# Patient Record
Sex: FEMALE | Race: BLACK OR AFRICAN AMERICAN | NOT HISPANIC OR LATINO
[De-identification: names, ages, dates, MRNs, and addresses within clinical notes are randomized per-mention and may not be internally consistent; named-entity substitution may affect disease eponyms.]

---

## 2022-09-02 ENCOUNTER — APPOINTMENT (OUTPATIENT)
Dept: ORTHOPEDIC SURGERY | Facility: CLINIC | Age: 72
End: 2022-09-02

## 2022-09-02 VITALS — HEIGHT: 70.5 IN | WEIGHT: 171 LBS | BODY MASS INDEX: 24.21 KG/M2

## 2022-09-02 DIAGNOSIS — I10 ESSENTIAL (PRIMARY) HYPERTENSION: ICD-10-CM

## 2022-09-02 PROCEDURE — 73110 X-RAY EXAM OF WRIST: CPT | Mod: RT

## 2022-09-02 PROCEDURE — 99214 OFFICE O/P EST MOD 30 MIN: CPT

## 2022-09-02 PROCEDURE — 73030 X-RAY EXAM OF SHOULDER: CPT | Mod: RT

## 2022-09-02 NOTE — IMAGING
[de-identified] : RT wrist Exam: Terry weight bearing on the RT hand produces mild to moderate pain on the terry surface, which radiates to the volar aspect of the forearm.  Finkelstein's test is negative.   strength is good and does not reproduce wrist pain.  There is no tenderness over the SL junction. There is no snuff box tenderness or any other tenderness.\par \par RT shoulder Exam: GH abduction is 80 degrees  ER at full abduction if forearm vertical with pain at the endpoint.  ER at zero is 70 degrees with pain at the endpoint.  There is a slight internal impingement sign.  Neer's test is positive for internal impingement.  Empty can strength is very good and mildly painful.  ER strength is excellent.  IR strength is excellent.\par \par X-ray of the RT shoulder (2 views) on 09/02/2022:\par RT Shoulder: Unremarkable\par \par X-ray of the RT Wrist (5 views) on 09/02/2022 :\par RT Wrist: Slight OA of thumb intermediate, otherwise unremarkable.\par  \par

## 2022-09-02 NOTE — HISTORY OF PRESENT ILLNESS
[Gradual] : gradual [9] : 9 [5] : 5 [Dull/Aching] : dull/aching [Sharp] : sharp [Stabbing] : stabbing [Household chores] : household chores [Leisure] : leisure [Rest] : rest [Heat] : heat [Intermittent] : intermittent [de-identified] : 09/02/2022: RT Wrist and RT shoulder\par Pt reports pain in the RT wrist occurs when grasping objects, twisting the wrist, and lifting.  Pt reports weakness in the RT wrist, and jolts of pain when performing aggravating motions.  Pt reports dull pain in the RUE when lifting herself out of a chair or getting up after laying down in bed.  Pt reports stiffness and radiating pain in the fingers that radiate up to the forearm.  Pt denies N/T in the fingers. [] : no [FreeTextEntry1] : Right wrist and RT shoulder [FreeTextEntry7] : Right shoulder down to elbow/ the wrist is isolated [de-identified] : Activity

## 2022-09-02 NOTE — DISCUSSION/SUMMARY
[de-identified] : 1) I recommend CSI injection in the RT shoudler.  Pt is not interest in CSI injections at this time.\par 2) I recommend PT to teach the patient HEP for strengthening the RT shoulder.\par 3) Pt will continue with conservative treatment including activity modification and home exercise as tolerated. \par \par The patient will F/U in 6 weeks for further evaluation and treatment.\par \par \par The patient was advised of the diagnosis.  The natural history of the pathology was explained in full to the patient in layman's terms, including but not limited to the risks, symptoms and available options for treatment.  We discussed the risks, benefits and alternatives of the treatment options and the advice I provided to the patient as listed above.  Pt was given the opportunity to ask questions, and all questions were answered.  The discussion was not limited to the above.\par \par Entered by Markie Goel acting as scribe.\par

## 2022-10-18 ENCOUNTER — APPOINTMENT (OUTPATIENT)
Dept: ORTHOPEDIC SURGERY | Facility: CLINIC | Age: 72
End: 2022-10-18

## 2022-10-18 VITALS — WEIGHT: 168 LBS | HEIGHT: 71 IN | BODY MASS INDEX: 23.52 KG/M2

## 2022-10-18 DIAGNOSIS — Z78.9 OTHER SPECIFIED HEALTH STATUS: ICD-10-CM

## 2022-10-18 PROCEDURE — 99214 OFFICE O/P EST MOD 30 MIN: CPT

## 2022-10-18 RX ORDER — AMLODIPINE BESYLATE 5 MG/1
TABLET ORAL
Refills: 0 | Status: ACTIVE | COMMUNITY

## 2022-10-18 NOTE — IMAGING
[de-identified] : RT wrist Exam: Terry weight bearing on the RT hand produces mild to moderate pain on the terry surface, which radiates to the volar aspect of the forearm.  Finkelstein's test is negative.   strength is good and does not reproduce wrist pain.  There is no tenderness over the SL junction. There is no snuff box tenderness or any other tenderness.\par \par RT shoulder Exam: GH abduction is 80 degrees  ER at full abduction if forearm vertical with pain at the endpoint.  ER at zero is 70 degrees with pain at the endpoint.  There is a slight internal impingement sign.  Neer's test is positive for internal impingement.  Empty can strength is very good and mildly painful.  ER strength is excellent.  IR strength is excellent.\par \par X-ray of the RT shoulder (2 views) on 09/02/2022: Unremarkable\par \par X-ray of the RT Wrist (5 views) on 09/02/2022: Slight OA of thumb senior living, otherwise unremarkable.\par  \par

## 2022-10-18 NOTE — HISTORY OF PRESENT ILLNESS
[Gradual] : gradual [6] : 6 [2] : 2 [Dull/Aching] : dull/aching [Sharp] : sharp [Stabbing] : stabbing [Frequent] : frequent [Household chores] : household chores [Leisure] : leisure [Rest] : rest [Heat] : heat [de-identified] : 09/02/2022: RT Wrist and RT shoulder\par Pt reports pain in the RT wrist occurs when grasping objects, twisting the wrist, and lifting.  Pt reports weakness in the RT wrist, and jolts of pain when performing aggravating motions.  Pt reports dull pain in the RUE when lifting herself out of a chair or getting up after laying down in bed.  Pt reports stiffness and radiating pain in the fingers that radiate up to the forearm.  Pt denies N/T in the fingers.\par \par 10/18/2022:  RT Wrist and RT shoulder\par Pt reports that she went to 5 sessions of PT, with her last session immediately before this visit.  PT has made a significant difference in the patient's condition, including an improvement with ROM and stretching the shoulder without pain.  Pt reports that her RT wrist pain and strength has improved.  She was only able to lift 1lb with her RT hand, but can now lift 2lbs.  The patient is improving her ability to lift herself out of a chair.  She still has trouble reaching high cabinets in the kitchen.  The patient has been modifying her activity to reduce the amount of weight she lifts, and reduces the period of time she performs activities that strain her RUE. [] : no [FreeTextEntry1] : Right wrist and RT shoulder [FreeTextEntry7] : Right shoulder down to elbow/ the wrist is isolated [de-identified] : Activity  [de-identified] : Dr. Reynoso  [de-identified] : 10/18/2022 [de-identified] : X-Ray

## 2022-10-18 NOTE — DISCUSSION/SUMMARY
[de-identified] : 1) I recommend CSI injection in the RT shoulder.  Pt is not interest in CSI injections at this time.\par 2) Rx for PT to continue current program of strengthening and ROM for the RT shoulder and RT wrist.\par 3) The patient may take OTC NSAIDs PRN for pain. \par 3) Pt will continue with conservative treatment including activity modification and home exercise as tolerated. \par \par The patient will F/U in 6 weeks for further evaluation and treatment.\par \par NSAIDs- The patient was informed of the risks of this medication to GI tract, increased blood pressure, cardiac risk, and risk of fluid retention. Advised to clear medication with internist or PCP if any concurrent health problem with heart, blood pressure, or GI system exists.  The risks, benefits and alternatives of NSAIDs were discussed.  The patient was instructed on the proper usage of  NSAIDs. \par \par The patient was advised of the diagnosis.  The natural history of the pathology was explained in full to the patient in layman's terms, including but not limited to the risks, symptoms and available options for treatment.  We discussed the risks, benefits and alternatives of the treatment options and the advice I provided to the patient as listed above.  Pt was given the opportunity to ask questions, and all questions were answered.  The discussion was not limited to the above.\par \par Entered by Markie Goel acting as scribe.\par

## 2022-11-01 ENCOUNTER — APPOINTMENT (OUTPATIENT)
Dept: ORTHOPEDIC SURGERY | Facility: CLINIC | Age: 72
End: 2022-11-01

## 2022-11-01 VITALS — WEIGHT: 168 LBS | BODY MASS INDEX: 23.52 KG/M2 | HEIGHT: 71 IN

## 2022-11-01 DIAGNOSIS — M47.26 OTHER SPONDYLOSIS WITH RADICULOPATHY, LUMBAR REGION: ICD-10-CM

## 2022-11-01 PROCEDURE — 72170 X-RAY EXAM OF PELVIS: CPT

## 2022-11-01 PROCEDURE — 99214 OFFICE O/P EST MOD 30 MIN: CPT

## 2022-11-01 PROCEDURE — 72110 X-RAY EXAM L-2 SPINE 4/>VWS: CPT

## 2022-11-02 NOTE — IMAGING
[de-identified] : lumbar- +ttp right lower back, full painless range of motion right hip no pain in the groin, + SLR on the right, notes radicular complaints posterior right leg to the foot, strength and sensation intact fully distally  [FreeTextEntry1] : l5-s1 narrowing with posterior facet arthropathy, t12-l1 bridging anterior osteophyte formation [de-identified] : Right hip oa mod and uterine fibroids noted

## 2022-11-02 NOTE — HISTORY OF PRESENT ILLNESS
[Lower back] : lower back [Gradual] : gradual [9] : 9 [8] : 8 [Dull/Aching] : dull/aching [Radiating] : radiating [Sharp] : sharp [Constant] : constant [Nothing helps with pain getting better] : Nothing helps with pain getting better [Retired] : Work status: retired [de-identified] : 11/1/22-  she notes chronic right sided lower back pain with radicular complaints through the posterior right leg into the foot. symptoms are exacerbated with ambulation and prolonged standing but does is also felt while at rest. she has done ice and heat treatments does not want to take any meds.  [] : no [FreeTextEntry5] : pt is 72 years old female who present evaluation of the lumber spine, pt  states she has been experiencing pain on her lower back for years the pain radiating from her back to her right leg  [FreeTextEntry7] : right leg

## 2022-11-20 ENCOUNTER — NON-APPOINTMENT (OUTPATIENT)
Age: 72
End: 2022-11-20

## 2022-11-21 ENCOUNTER — APPOINTMENT (OUTPATIENT)
Dept: VASCULAR SURGERY | Facility: CLINIC | Age: 72
End: 2022-11-21

## 2022-11-21 VITALS
WEIGHT: 176 LBS | OXYGEN SATURATION: 100 % | DIASTOLIC BLOOD PRESSURE: 78 MMHG | HEIGHT: 71 IN | SYSTOLIC BLOOD PRESSURE: 138 MMHG | TEMPERATURE: 98.2 F | BODY MASS INDEX: 24.64 KG/M2 | HEART RATE: 64 BPM

## 2022-11-21 PROCEDURE — 99203 OFFICE O/P NEW LOW 30 MIN: CPT

## 2022-11-29 ENCOUNTER — APPOINTMENT (OUTPATIENT)
Dept: ORTHOPEDIC SURGERY | Facility: CLINIC | Age: 72
End: 2022-11-29

## 2022-11-29 VITALS — HEIGHT: 71 IN | WEIGHT: 176 LBS | BODY MASS INDEX: 24.64 KG/M2

## 2022-11-29 PROCEDURE — 99213 OFFICE O/P EST LOW 20 MIN: CPT

## 2022-11-29 NOTE — IMAGING
[de-identified] : RT wrist Exam: Suggs weight bearing on the RT hand does not produce pain on the palmar surface.  Finkelstein's test is negative.   strength is good and does not reproduce wrist pain.  There is no tenderness over the SL junction. There is no snuff box tenderness or any other tenderness.\par \par RT shoulder Exam: GH abduction is 80 degrees  ER at full abduction if forearm vertical with pain at the endpoint.  ER at zero is 70 degrees with pain at the endpoint.  There is a slight internal impingement sign.  Neer's test is positive for internal impingement.  Empty can strength is very good and mildly painful.  ER strength is excellent.  IR strength is excellent.\par \par X-ray of the RT shoulder (2 views) on 09/02/2022: Unremarkable\par \par X-ray of the RT Wrist (5 views) on 09/02/2022: Slight OA of thumb long-term, otherwise unremarkable.\par  \par

## 2022-11-29 NOTE — HISTORY OF PRESENT ILLNESS
[Gradual] : gradual [6] : 6 [2] : 2 [Dull/Aching] : dull/aching [Sharp] : sharp [Stabbing] : stabbing [Frequent] : frequent [Household chores] : household chores [Leisure] : leisure [Rest] : rest [Heat] : heat [de-identified] : 09/02/2022: RT Wrist and RT shoulder\par Pt reports pain in the RT wrist occurs when grasping objects, twisting the wrist, and lifting.  Pt reports weakness in the RT wrist, and jolts of pain when performing aggravating motions.  Pt reports dull pain in the RUE when lifting herself out of a chair or getting up after laying down in bed.  Pt reports stiffness and radiating pain in the fingers that radiate up to the forearm.  Pt denies N/T in the fingers.\par \par 10/18/2022:  RT Wrist and RT shoulder\par Pt reports that she went to 5 sessions of PT, with her last session immediately before this visit.  PT has made a significant difference in the patient's condition, including an improvement with ROM and stretching the shoulder without pain.  Pt reports that her RT wrist pain and strength has improved.  She was only able to lift 1lb with her RT hand, but can now lift 2lbs.  The patient is improving her ability to lift herself out of a chair.  She still has trouble reaching high cabinets in the kitchen.  The patient has been modifying her activity to reduce the amount of weight she lifts, and reduces the period of time she performs activities that strain her RUE.\par \par 11/29/2022: Right Wrist and Right Shoulder\par Pt reports that her condition has improved following last visit, in which she has been attending PT twice weekly, performing at-home exercises in intervals, modifying activity, and resting her RUE.  Pt reports that she also uses heat for comfort.   [] : no [FreeTextEntry1] : Right Wrist and Right Shoulder [FreeTextEntry7] : Right shoulder down to elbow/ the wrist is isolated [de-identified] : Activity  [de-identified] : Dr. Reynoso  [de-identified] : 10/18/2022 [de-identified] : X-Ray

## 2022-11-29 NOTE — DISCUSSION/SUMMARY
[de-identified] : 1) I recommend CSI injection in the RT shoulder.  Pt is not interest in CSI injections at this time.\par 2) Rx for PT to continue current program of strengthening and ROM for the RT shoulder and RT wrist.\par 3) The patient may take OTC NSAIDs PRN for pain. \par 3) Pt will continue with activity modification and home exercise as tolerated.  The patient should avoid exercise and activity that aggravates pain. \par \par The patient will continue with conservative treatment as described above, and will F/U in 6 weeks.\par \par NSAIDs- The patient was informed of the risks of this medication to GI tract, increased blood pressure, cardiac risk, and risk of fluid retention. Advised to clear medication with internist or PCP if any concurrent health problem with heart, blood pressure, or GI system exists.  The risks, benefits and alternatives of NSAIDs were discussed.  The patient was instructed on the proper usage of  NSAIDs. \par \par The patient was advised of the diagnosis.  The natural history of the pathology was explained in full to the patient in layman's terms, including but not limited to the risks, symptoms and available options for treatment.  We discussed the risks, benefits and alternatives of the treatment options and the advice I provided to the patient as listed above.  Pt was given the opportunity to ask questions, and all questions were answered.  The discussion was not limited to the above.\par \par Entered by Markie Goel acting as scribe.\par

## 2022-12-07 NOTE — HISTORY OF PRESENT ILLNESS
[FreeTextEntry1] : REMY RUST is a 72 year old female who presents for evaluation of varicose veins. \par \par Referred by Dr. Caty Romero. \par \par Patient states that she has had varicose veins since her 20s. She is a former patient of Dr. Mo, who is now retired. She has had procedures done on her bilateral legs with Dr. Mo several years ago. More recently, the left leg has been causing her discomfort. She describes pain with walking with leg swelling. She continues to wear compression stockings. Rather than pain, she reports heaviness and aching that occurs while she is walking. Her left leg varicose veins have also increased in size. No personal or family history of DVT. \par Patient reports a history of a left leg ulcer 20 years ago when she was treated with unna boots by Dr. Mo. \par She reports that she recently underwent a venous reflux study with her cardiologist, Dr. Pereira. \par \par + PMH: HTN\par + PSH: s/p bilateral leg vein procedures\par + FH: HTN, diabetes\par + SH: nonsmoker, no etoh use\par + Aller: NKDA\par \par PCP is Dr. Caty Romero.

## 2022-12-07 NOTE — ASSESSMENT
[FreeTextEntry1] : REMY RUST is a 72 year old female presents for evaluation of varicose veins.\par \par > Venous insufficiency, CEAP C5\par - Patient with recent venous reflux study. Will reach out to Dr. Pereira's office to obtain. \par - Continue compression stockings, leg elevation, and ambulation.\par - Further management upon review of venous reflux studies.

## 2022-12-07 NOTE — ADDENDUM
[FreeTextEntry1] : 12/7/2022 - Spoke w/ patient regarding venous duplex results obtained from Dr. Pereira's office.\par Given symptomatic varicose veins of the left leg despite conservative therapy, will plan for ablation therapy and stab phlebectomy.

## 2022-12-07 NOTE — REVIEW OF SYSTEMS
[As Noted in HPI] : as noted in HPI [Negative] : Heme/Lymph [FreeTextEntry9] : right shoulder pain - undergoing PT

## 2022-12-07 NOTE — PHYSICAL EXAM
[2+] : left 2+ [Varicose Veins Of Lower Extremities] : present [Varicose Veins Of The Left Leg] : of the left leg [Ankle Swelling Bilaterally] : severe [] : bilaterally [Ankle Swelling On The Right] : mild [Calm] : calm [Ankle Swelling (On Exam)] : not present [de-identified] : Well-appearing  [de-identified] : EOMI, anicteric  [de-identified] : soft, nt, nd  [de-identified] : motor and sensation intact in all four extremities  [de-identified] : no wounds on bilateral feet [de-identified] : A&Ox4

## 2022-12-13 ENCOUNTER — APPOINTMENT (OUTPATIENT)
Dept: ORTHOPEDIC SURGERY | Facility: CLINIC | Age: 72
End: 2022-12-13

## 2022-12-13 VITALS — HEIGHT: 71 IN | WEIGHT: 176 LBS | BODY MASS INDEX: 24.64 KG/M2

## 2022-12-13 DIAGNOSIS — M54.16 RADICULOPATHY, LUMBAR REGION: ICD-10-CM

## 2022-12-13 DIAGNOSIS — M48.062 SPINAL STENOSIS, LUMBAR REGION WITH NEUROGENIC CLAUDICATION: ICD-10-CM

## 2022-12-13 PROCEDURE — 99213 OFFICE O/P EST LOW 20 MIN: CPT

## 2022-12-13 NOTE — HISTORY OF PRESENT ILLNESS
[Lower back] : lower back [Gradual] : gradual [Dull/Aching] : dull/aching [Radiating] : radiating [Sharp] : sharp [Constant] : constant [Nothing helps with pain getting better] : Nothing helps with pain getting better [Retired] : Work status: retired [7] : 7 [Heat] : heat [Sitting] : sitting [] : no [FreeTextEntry5] : REMY RUST is a 72 year old female presenting for follow up of lower back. Pt states she has had 4 sessions of PT so far and it has helped greatly. [FreeTextEntry7] : right leg

## 2022-12-29 RX ORDER — LIDOCAINE HYDROCHLORIDE 10 MG/ML
1 INJECTION, SOLUTION INFILTRATION; PERINEURAL
Qty: 50 | Refills: 0 | Status: COMPLETED | COMMUNITY
Start: 2022-12-29 | End: 2023-01-03

## 2022-12-29 RX ORDER — SODIUM BICARBONATE 84 MG/ML
8.4 INJECTION, SOLUTION INTRAVENOUS
Qty: 5 | Refills: 0 | Status: COMPLETED | COMMUNITY
Start: 2022-12-29 | End: 2023-01-03

## 2023-01-03 ENCOUNTER — APPOINTMENT (OUTPATIENT)
Dept: VASCULAR SURGERY | Facility: CLINIC | Age: 73
End: 2023-01-03
Payer: MEDICARE

## 2023-01-03 ENCOUNTER — NON-APPOINTMENT (OUTPATIENT)
Age: 73
End: 2023-01-03

## 2023-01-03 PROCEDURE — 36475 ENDOVENOUS RF 1ST VEIN: CPT | Mod: LT

## 2023-01-03 NOTE — PROCEDURE
[FreeTextEntry1] : left GSV RFA [FreeTextEntry3] : Procedural safety checklist and time out completed:\par Confirmed patient identification (Patient Name, , and/or medical record number including when possible affirmation by patient or parent/family/other).\par Confirmed procedure with the patient. Consent present, accurate and signed. \par Confirmed special equipment and supplies are present.\par Sterility confirmed. Position verified. \par Site/ side is marked and visible and confirmed. \par Procedure confirmed by consent. Accurate consent including side and site.\par Review of medical records, including venous ultrasound, noting correct procedure including site and side.\par MD/PA verifies presence and review of imaging studies and or written report of imaging studies.\par Agreement on the procedure to be performed\par Time out completed.\par All of the above has been confirmed by the team.\par All patient-specific concerns have been addressed. \par \par Indication: left lower extremity varicose veins with inflammation, leg pain, leg swelling, and leg cramping.  Venous insufficiency/ reflux.\par \par Procedure: radiofrequency ablation of the left great saphenous vein. \par 	\par Ms. REMY RUST is a 72 year old F with a history of left lower extremity varicose veins previously seen in the office.  Ultrasound examination demonstrated venous insufficiency. A trial of compression stockings, exercise, elevation, and pain medication was attempted without relief and definitive treatment with radiofrequency ablation was offered. \par \par The patient has come for radiofrequency ablation treatment of the left great saphenous vein.\par I have discussed the risks of the procedure at length with the patient. The risks discussed were inclusive of but not limited to infection, irritation at the site of infiltration of local anesthesia, and also rare risk of deep venous thrombosis and pulmonary emboli. The patient agrees to proceed with the procedure. \par The patient was escorted into the procedure room and a time out called.\par The entire limb was prepped and draped in sterile fashion. The RF fiber was placed on the sterile field and connected by a sterile cable. Actuation, temperature, and impedance testing were performed to ensure that all components were connected and operating properly. \par The patient was placed on the procedure table and local anesthesia was instilled in the skin overlying the access site. Under ultrasound guidance, the vein was punctured with a micropuncture needle, using the anterior wall technique. A guide wire was now introduced through the needle, and the needle was then exchanged over the guide wire for a 7F sheath. The guide wire was removed and the RF probe was then placed into the left great saphenous vein through the sheath and position confirmed using ultrasound guidance. After the RF probe position was verified by ultrasound, tumescent anesthesia consisting of normal saline, 1% lidocaine with 8.4% sodium bicarbonate was infiltrated, under ultrasound guidance, precisely into the perivenous compartment along the entire length of the vein until a “halo” of fluid was noted around the vein. After RF probe position was again confirmed with ultrasound imaging, RF energy was applied. The probe was gradually and carefully withdrawn at a rate of 6.5cm/20seconds. \par \par 3 cycles of RF performed using the 3 cm probe\par Total treatment time was  60 seconds.\par The total volume injected was 250cc\par Treatment length was 5.5 cm and \par The probe is >3.5 cm from the SFJ.\par \par Estimated Blood Loss: minimal\par Repeat ultrasound of the treated vein was performed confirming successful treatment. The catheter and sheath were withdrawn and hemostasis established with direct pressure. After assuring hemostasis, a sterile 4x4 was placed on the access site and an ACE compression wrap was applied. Patient tolerated procedure well. Patient was given post-procedure instructions and follow up appointment was scheduled.\par \par \par

## 2023-01-06 ENCOUNTER — APPOINTMENT (OUTPATIENT)
Dept: VASCULAR SURGERY | Facility: CLINIC | Age: 73
End: 2023-01-06
Payer: MEDICARE

## 2023-01-06 PROCEDURE — 93971 EXTREMITY STUDY: CPT | Mod: LT

## 2023-01-10 ENCOUNTER — APPOINTMENT (OUTPATIENT)
Dept: ORTHOPEDIC SURGERY | Facility: CLINIC | Age: 73
End: 2023-01-10
Payer: MEDICARE

## 2023-01-10 VITALS — WEIGHT: 173 LBS | BODY MASS INDEX: 24.22 KG/M2 | HEIGHT: 71 IN

## 2023-01-10 VITALS — HEIGHT: 71 IN | BODY MASS INDEX: 24.64 KG/M2 | WEIGHT: 176 LBS

## 2023-01-10 PROCEDURE — 99213 OFFICE O/P EST LOW 20 MIN: CPT

## 2023-01-10 NOTE — IMAGING
[de-identified] : RT Wrist Exam: Pronation and supination ranges are full without pain at the extremes.  Suggs weight bearing on the RT hand does not produce pain on the palmar surface.  Finkelstein's test is negative.   strength is good and does not reproduce wrist pain.  There is no tenderness over the SL junction. There is no snuff box tenderness or any other tenderness.\par \par RT Shoulder Exam: GH abduction is 80 degrees.  ER at full abduction is forearm vertical without pain.  ER at zero is 70 degrees with mild pain at the endpoint.  There is a slight internal impingement sign.  Full active abduction.  IR is to upper lumbar.  Neer's test is negative.  Empty can strength is very good and mildly painful.  ER strength is excellent.  IR strength is excellent.\par \par X-ray of the RT shoulder (2 views) on 09/02/2022: Unremarkable\par \par X-ray of the RT Wrist (5 views) on 09/02/2022: Slight OA of thumb alf, otherwise unremarkable.\par  \par

## 2023-01-10 NOTE — HISTORY OF PRESENT ILLNESS
[Gradual] : gradual [3] : 3 [1] : 2 [Dull/Aching] : dull/aching [Sharp] : sharp [Stabbing] : stabbing [Frequent] : frequent [Household chores] : household chores [Leisure] : leisure [Rest] : rest [Heat] : heat [de-identified] : 09/02/2022: RT Wrist and RT shoulder\par Pt reports pain in the RT wrist occurs when grasping objects, twisting the wrist, and lifting.  Pt reports weakness in the RT wrist, and jolts of pain when performing aggravating motions.  Pt reports dull pain in the RUE when lifting herself out of a chair or getting up after laying down in bed.  Pt reports stiffness and radiating pain in the fingers that radiate up to the forearm.  Pt denies N/T in the fingers.\par \par 10/18/2022:  RT Wrist and RT shoulder\par Pt reports that she went to 5 sessions of PT, with her last session immediately before this visit.  PT has made a significant difference in the patient's condition, including an improvement with ROM and stretching the shoulder without pain.  Pt reports that her RT wrist pain and strength has improved.  She was only able to lift 1lb with her RT hand, but can now lift 2lbs.  The patient is improving her ability to lift herself out of a chair.  She still has trouble reaching high cabinets in the kitchen.  The patient has been modifying her activity to reduce the amount of weight she lifts, and reduces the period of time she performs activities that strain her RUE.\par \par 11/29/2022: Right Wrist and Right Shoulder\par Pt reports that her condition has improved following last visit, in which she has been attending PT twice weekly, performing at-home exercises in intervals, modifying activity, and resting her RUE.  Pt reports that she also uses heat for comfort.  \par \par 01/10/2023: Right Wrist and Right Shoulder\par Pt reports that her RT shoulder and RT wrist pain have both significantly improved following physical therapy and home exercise since last visit.  She attends PT for the past two months twice weekly at [facility].  She continues to perform at-home stretching exercises that she learned at PT for the RT shoulder and wrist.  Still has some pain with reaching behind the back to close a zipper. [] : no [FreeTextEntry1] : Right Wrist and Right Shoulder [FreeTextEntry5] : Pt states that physical therapy is helping her tremendously.   [FreeTextEntry7] : Right shoulder down to elbow/ the wrist is isolated [de-identified] : Activity  [de-identified] : Dr. Reynoso  [de-identified] : 01/05/2023 [de-identified] : X-Ray [de-identified] : physical therapy

## 2023-01-10 NOTE — DISCUSSION/SUMMARY
[de-identified] : 1) I discussed the risks, benefits and alternatives of treatment options for the RT shoulder, including activity modification, rest, home exercise and stretching, continuing physical therapy, oral antiinflammatory medication, SA CSI injections, and observation.\par 2) I discussed the risks, benefits and alternatives of treatment options for the RT wrist, including activity modification, rest, home exercise, oral antiinflammatory medication, physical therapy, oral antiinflammatory medication, and observation.\par 3) **Renewed Rx for PT to continue current program of strengthening and ROM for the RT shoulder and RT wrist.\par 4) The patient may take OTC NSAIDs PRN for pain. \par 5) Pt will continue with activity modification and home exercise as tolerated.  The patient should avoid exercise and activity that aggravates pain. \par \par The patient will continue with conservative treatment as described above, and will F/U in 6 weeks.\par \par NSAIDs- The patient was informed of the risks, benefits and alternatives of this medication, including risks to the GI tract, kidneys, liver, increased blood pressure, cardiac risk, and risk of fluid retention.  Advised to clear medication with internist or PCP if any concurrent health problem with heart, blood pressure, or GI system exists.  The patient was instructed on the proper usage of NSAIDs. \par \par The patient was advised of the diagnosis.  The natural history of the pathology was explained in full to the patient in layman's terms, including but not limited to the risks, symptoms and available options for treatment.  We discussed the risks, benefits and alternatives of the treatment options and the advice I provided to the patient as listed above.  Pt was given the opportunity to ask questions, and all questions were answered.  The discussion was not limited to the above.\par \par Entered by Markie Goel acting as scribe.\par

## 2023-02-01 RX ORDER — ALPRAZOLAM 0.5 MG/1
0.5 TABLET ORAL
Qty: 1 | Refills: 0 | Status: COMPLETED | COMMUNITY
Start: 2023-02-01 | End: 2023-02-09

## 2023-02-07 ENCOUNTER — APPOINTMENT (OUTPATIENT)
Dept: ORTHOPEDIC SURGERY | Facility: CLINIC | Age: 73
End: 2023-02-07
Payer: MEDICARE

## 2023-02-07 VITALS — WEIGHT: 176 LBS | HEIGHT: 71 IN | BODY MASS INDEX: 24.64 KG/M2

## 2023-02-07 PROCEDURE — 99213 OFFICE O/P EST LOW 20 MIN: CPT

## 2023-02-07 NOTE — DISCUSSION/SUMMARY
[de-identified] : 1) I discussed the risks, benefits and alternatives of treatment options for the RT shoulder, including activity modification, rest, home exercise and stretching, continuing physical therapy, oral antiinflammatory medication, SA CSI injections, and observation.\par 2) I discussed the risks, benefits and alternatives of treatment options for the RT wrist, including activity modification, rest, home exercise, oral antiinflammatory medication, physical therapy, oral antiinflammatory medication, and observation.\par 3) **Renewed Rx for PT to continue current program of strengthening and ROM for the RT shoulder and RT wrist.\par 4) The patient may take OTC NSAIDs PRN for pain. \par 5) Pt will continue with activity modification and home exercise as tolerated.  The patient should avoid exercise and activity that aggravates pain. \par \par The patient will continue with conservative treatment as described above, and will F/U in 6 weeks.\par \par NSAIDs- The patient was informed of the risks, benefits and alternatives of this medication, including risks to the GI tract, kidneys, liver, increased blood pressure, cardiac risk, and risk of fluid retention.  Advised to clear medication with internist or PCP if any concurrent health problem with heart, blood pressure, or GI system exists.  The patient was instructed on the proper usage of NSAIDs. \par \par The patient was advised of the diagnosis.  The natural history of the pathology was explained in full to the patient in layman's terms, including but not limited to the risks, symptoms and available options for treatment.  We discussed the risks, benefits and alternatives of the treatment options and the advice I provided to the patient as listed above.  Pt was given the opportunity to ask questions, and all questions were answered.  The discussion was not limited to the above.\par \par Entered by Markie Goel acting as scribe.\par

## 2023-02-07 NOTE — IMAGING
[de-identified] : RT Wrist Exam: Pronation and supination ranges are full without pain at the extremes.  Suggs weight bearing on the RT hand does not produce pain on the palmar surface.  Finkelstein's test is negative.   strength is good and does not reproduce wrist pain.  There is no tenderness over the SL junction. There is no snuff box tenderness or any other tenderness.\par \par RT Shoulder Exam: GH abduction is 80 degrees.  ER at full abduction is forearm vertical without pain.  ER at zero is 70 degrees with mild pain at the endpoint.  Full active abduction.  IR is to upper lumbar.  Neer's test is negative.  Empty can strength is excellent and not painful.  ER strength is excellent.  IR strength is excellent.\par \par X-ray of the RT shoulder (2 views) on 09/02/2022: Unremarkable\par \par X-ray of the RT Wrist (5 views) on 09/02/2022: Slight OA of thumb longterm, otherwise unremarkable.\par  \par

## 2023-02-07 NOTE — HISTORY OF PRESENT ILLNESS
[Gradual] : gradual [3] : 3 [1] : 2 [Dull/Aching] : dull/aching [Sharp] : sharp [Stabbing] : stabbing [Frequent] : frequent [Household chores] : household chores [Leisure] : leisure [Rest] : rest [Heat] : heat [de-identified] : 09/02/2022: RT Wrist and RT shoulder\par Pt reports pain in the RT wrist occurs when grasping objects, twisting the wrist, and lifting.  Pt reports weakness in the RT wrist, and jolts of pain when performing aggravating motions.  Pt reports dull pain in the RUE when lifting herself out of a chair or getting up after laying down in bed.  Pt reports stiffness and radiating pain in the fingers that radiate up to the forearm.  Pt denies N/T in the fingers.\par \par 10/18/2022:  RT Wrist and RT shoulder\par Pt reports that she went to 5 sessions of PT, with her last session immediately before this visit.  PT has made a significant difference in the patient's condition, including an improvement with ROM and stretching the shoulder without pain.  Pt reports that her RT wrist pain and strength has improved.  She was only able to lift 1lb with her RT hand, but can now lift 2lbs.  The patient is improving her ability to lift herself out of a chair.  She still has trouble reaching high cabinets in the kitchen.  The patient has been modifying her activity to reduce the amount of weight she lifts, and reduces the period of time she performs activities that strain her RUE.\par \par 11/29/2022: Right Wrist and Right Shoulder\par Pt reports that her condition has improved following last visit, in which she has been attending PT twice weekly, performing at-home exercises in intervals, modifying activity, and resting her RUE.  Pt reports that she also uses heat for comfort.  \par \par 01/10/2023: Right Wrist and Right Shoulder\par Pt reports that her RT shoulder and RT wrist pain have both significantly improved following physical therapy and home exercise since last visit.  She attends PT for the past two months twice weekly at [facility].  She continues to perform at-home stretching exercises that she learned at PT for the RT shoulder and wrist.  Still has some pain with reaching behind the back to close a zipper.\par \par 02/07/2023: Right Wrist and Right Shoulder\par Pt reports that her RT shoulder is significantly improved following PT.  Pt reports that she had a temporary slight increase in pain following [place] suitcase at the airport.  Pt reports that RT wrist pain is also improved following PT.  Pt reports that she can lift a 5lb kitchen pot without difficulty. [] : no [FreeTextEntry1] : Right Wrist and Right Shoulder [FreeTextEntry5] : Pt states that physical therapy is helping her tremendously.   [FreeTextEntry7] : Right shoulder down to elbow/ the wrist is isolated [de-identified] : Activity  [de-identified] : Dr. Reynoso  [de-identified] : 01/05/2023 [de-identified] : X-Ray [de-identified] : physical therapy

## 2023-02-09 ENCOUNTER — APPOINTMENT (OUTPATIENT)
Dept: VASCULAR SURGERY | Facility: CLINIC | Age: 73
End: 2023-02-09
Payer: MEDICARE

## 2023-02-09 DIAGNOSIS — I83.893 VARICOSE VEINS OF BILATERAL LOWER EXTREMITIES WITH OTHER COMPLICATIONS: ICD-10-CM

## 2023-02-09 PROCEDURE — 37765 STAB PHLEB VEINS XTR 10-20: CPT | Mod: LT

## 2023-02-09 NOTE — PROCEDURE
[FreeTextEntry1] : left stab phlebectomy [FreeTextEntry3] : Procedural safety checklist and time out completed:\par Confirmed patient identification (Patient Name, , and/or medical record number including when possible affirmation by patient or parent/family/other.\par Confirmed procedure with the patient. Consent present, accurate and signed. \par Confirmed special equipment and supplies are present.\par Sterility confirmed. Position verified. \par Site/ side is marked and visible and confirmed. \par Procedure confirmed by consent. Accurate consent including side and site.\par Review of medical records noting correct procedure including site and side.\par MD/PA verifies presence and review of imaging studies and or written report of imaging studies.\par Specify equipment are available for the planned procedure.\par MD/PA has marked the patient's procedural site and side.\par Agreement on the procedure to be performed\par Time out completed.\par All of the above has been confirmed by the team.\par All patient-specific concerns have been addressed. \par \par Indication:  left lower extremity varicose veins with inflammation, leg pain, leg swelling, and leg cramping.  \par \par Procedure: Stab phlebectomy left lower extremity\par \par  Ms. REMY RUST is a 72 year old F with a history of symptomatic left lower extremity varicose veins. A trial of compression stockings, exercise, elevation, and pain medication was attempted without relief and definitive treatment with microphlebectomy was offered. \par \par I have discussed the risks of the procedure at length with the patient. The risks discussed were inclusive of but not limited to infection, irritation at the site of infiltration of local anesthesia, and also rare risk of deep venous thrombosis and pulmonary emboli. The patient agrees to proceed with the procedure. \par The patient was escorted into the procedure room, the varicose veins for treatment were marked out and the patient placed on the examination table. The entire limb was prepped and draped in sterile fashion and a  time out was called. \par \par Local anesthesia using 10 cc 1% lidocaine was infiltrated using a 25 gauge needle over the previously marked prominent varicose vein sites.\par Multiple small stab incisions, each less than 1 cm in length was made at the noted sites. With the help of a vein hook, the vein was fished out at each of these sites, rolled over a narrow-tipped mosquito clamp and removed. Hemostasis was secured with leg elevation and application of manual pressure. After assuring hemostasis, a sterile 4x4 was placed on the access sites and an ACE compression wrap was applied. Estimated blood loss: minimal. Patient tolerated procedure well. Patient was given post-procedure instructions and follow up appointment was scheduled. \par \par SIDE - LEFT	\par SITE - CALF\par LOCATION - MEDIAL	\par Total Stab Incisions 10-20\par \par

## 2023-03-06 ENCOUNTER — APPOINTMENT (OUTPATIENT)
Dept: VASCULAR SURGERY | Facility: CLINIC | Age: 73
End: 2023-03-06
Payer: MEDICARE

## 2023-03-06 PROCEDURE — 99441: CPT | Mod: 95

## 2023-03-06 NOTE — HISTORY OF PRESENT ILLNESS
[FreeTextEntry1] : REMY RUST is a 72 year old female who presents for evaluation of varicose veins. \par \par Referred by Dr. Caty Romero. \par \par Patient states that she has had varicose veins since her 20s. She is a former patient of Dr. Mo, who is now retired. She has had procedures done on her bilateral legs with Dr. Mo several years ago. More recently, the left leg has been causing her discomfort. She describes pain with walking with leg swelling. She continues to wear compression stockings. Rather than pain, she reports heaviness and aching that occurs while she is walking. Her left leg varicose veins have also increased in size. No personal or family history of DVT. \par Patient reports a history of a left leg ulcer 20 years ago when she was treated with unna boots by Dr. Mo. \par She reports that she recently underwent a venous reflux study with her cardiologist, Dr. Pereira. \par \par + PMH: HTN\par + PSH: s/p bilateral leg vein procedures\par + FH: HTN, diabetes\par + SH: nonsmoker, no etoh use\par + Aller: NKDA\par \par PCP is Dr. Caty Romero. \par \par 12/7/2022 - Spoke w/ patient regarding venous duplex results obtained from Dr. Pereira's office.\par Given symptomatic varicose veins of the left leg despite conservative therapy, will plan for ablation therapy and stab phlebectomy.  [de-identified] : 1/3/2023–left GSV RFA\par 2/9/2023–left stab phlebectomy\par \par 3/6/2023 - Pt presents for follow up telephonic visit after left leg vein procedures. Patient reports that her left leg is feeling much better. Has been undergoing PT for her back and there is no left leg pain. Stab phlebectomy incisions are well healed. She continues to walk without pain.

## 2023-03-06 NOTE — ASSESSMENT
[FreeTextEntry1] : REMY RUST is a 72 year old female presents for evaluation of varicose veins.\par \par > Venous insufficiency, CEAP C5\par - s/p left GSV RFA and stab phlebectomy\par - doing well post procedure without pain\par - follow up as needed

## 2023-03-21 ENCOUNTER — APPOINTMENT (OUTPATIENT)
Dept: ORTHOPEDIC SURGERY | Facility: CLINIC | Age: 73
End: 2023-03-21
Payer: MEDICARE

## 2023-03-21 DIAGNOSIS — M25.531 PAIN IN RIGHT WRIST: ICD-10-CM

## 2023-03-21 DIAGNOSIS — M75.41 IMPINGEMENT SYNDROME OF RIGHT SHOULDER: ICD-10-CM

## 2023-03-21 DIAGNOSIS — S63.501A UNSPECIFIED SPRAIN OF RIGHT WRIST, INITIAL ENCOUNTER: ICD-10-CM

## 2023-03-21 PROCEDURE — 99213 OFFICE O/P EST LOW 20 MIN: CPT

## 2023-03-21 NOTE — IMAGING
[de-identified] : RT Wrist Exam: Pronation and supination ranges are full without pain at the extremes.  Suggs weight bearing on the RT hand is well tolerated. Finkelstein's test is negative.   strength is good and does not reproduce wrist pain.  There is no tenderness over the SL junction. There is no snuff box tenderness or any other tenderness.\par \par RT Shoulder Exam: GH abduction is 80 degrees.  ER at full abduction is forearm vertical without pain.  ER at zero is 70 degrees with mild pain at the endpoint.  Full active abduction.  IR is to upper lumbar. Neer's test is negative.  Empty can strength is excellent and not painful.  ER strength is excellent.  IR strength is excellent.\par \par X-ray of the RT shoulder (2 views) on 09/02/2022: Unremarkable\par \par X-ray of the RT Wrist (5 views) on 09/02/2022: Slight OA of thumb [facility], otherwise unremarkable.\par  \par

## 2023-03-21 NOTE — DISCUSSION/SUMMARY
[Medication Risks Reviewed] : Medication risks reviewed [de-identified] : 1) I discussed the risks, benefits and alternatives of treatment options for the RT shoulder, including activity modification, rest, home exercise and stretching, continuing physical therapy, oral antiinflammatory medication, SA CSI injections, and observation.\par 2) I discussed the risks, benefits and alternatives of treatment options for the RT wrist, including activity modification, rest, home exercise, oral antiinflammatory medication, physical therapy, oral antiinflammatory medication, and observation.\par 3) The patient may take OTC NSAIDs PRN for pain. \par 5) Pt will continue with activity modification and home exercise as tolerated.  The patient should avoid exercise and activity that aggravates pain. \par 6) Follow up PRN. \par \par NSAIDs- The patient was informed of the risks, benefits and alternatives of this medication, including risks to the GI tract, kidneys, liver, increased blood pressure, cardiac risk, and risk of fluid retention.  Advised to clear medication with internist or PCP if any concurrent health problem with heart, blood pressure, or GI system exists.  The patient was instructed on the proper usage of NSAIDs. \par \par The patient was advised of the diagnosis.  The natural history of the pathology was explained in full to the patient in layman's terms, including but not limited to the risks, symptoms and available options for treatment.  We discussed the risks, benefits and alternatives of the treatment options and the advice I provided to the patient as listed above.  Pt was given the opportunity to ask questions, and all questions were answered.  The discussion was not limited to the above.\par \par Entered by Estelle Preston acting as scribe. \par

## 2023-03-21 NOTE — ASSESSMENT
[FreeTextEntry1] : Sprain of right wrist, impingement syndrome of right shoulder and right wrist pain have resolved.

## 2023-03-21 NOTE — HISTORY OF PRESENT ILLNESS
[Gradual] : gradual [1] : 2 [Dull/Aching] : dull/aching [Sharp] : sharp [Stabbing] : stabbing [Frequent] : frequent [Household chores] : household chores [Leisure] : leisure [Rest] : rest [Heat] : heat [de-identified] : 09/02/2022: RT Wrist and RT shoulder\par Pt reports pain in the RT wrist occurs when grasping objects, twisting the wrist, and lifting.  Pt reports weakness in the RT wrist, and jolts of pain when performing aggravating motions.  Pt reports dull pain in the RUE when lifting herself out of a chair or getting up after laying down in bed.  Pt reports stiffness and radiating pain in the fingers that radiate up to the forearm.  Pt denies N/T in the fingers.\par \par 10/18/2022:  RT Wrist and RT shoulder\par Pt reports that she went to 5 sessions of PT, with her last session immediately before this visit.  PT has made a significant difference in the patient's condition, including an improvement with ROM and stretching the shoulder without pain.  Pt reports that her RT wrist pain and strength has improved.  She was only able to lift 1lb with her RT hand, but can now lift 2lbs.  The patient is improving her ability to lift herself out of a chair.  She still has trouble reaching high cabinets in the kitchen.  The patient has been modifying her activity to reduce the amount of weight she lifts, and reduces the period of time she performs activities that strain her RUE.\par \par 11/29/2022: Right Wrist and Right Shoulder\par Pt reports that her condition has improved following last visit, in which she has been attending PT twice weekly, performing at-home exercises in intervals, modifying activity, and resting her RUE.  Pt reports that she also uses heat for comfort.  \par \par 01/10/2023: Right Wrist and Right Shoulder\par Pt reports that her RT shoulder and RT wrist pain have both significantly improved following physical therapy and home exercise since last visit.  She attends PT for the past two months twice weekly at [facility].  She continues to perform at-home stretching exercises that she learned at PT for the RT shoulder and wrist.  Still has some pain with reaching behind the back to close a zipper.\par \par 02/07/2023: Right Wrist and Right Shoulder\par Pt reports that her RT shoulder is significantly improved following PT.  Pt reports that she had a temporary slight increase in pain following [place] suitcase at the airport.  Pt reports that RT wrist pain is also improved following PT.  Pt reports that she can lift a 5lb kitchen pot without difficulty.\par \par 03/21/2023: Right Wrist and Right Shoulder\par Pt reports that she has finished PT and is doing well. She reports no pain.  [] : no [FreeTextEntry1] : Right Wrist and Right Shoulder [FreeTextEntry5] : PT states she has completed P/T therapy.   [FreeTextEntry7] : Right shoulder down to elbow/ the wrist is isolated [de-identified] : Activity  [de-identified] : Dr. Reynoso  [de-identified] : 01/05/2023 [de-identified] : X-Ray [de-identified] : physical therapy

## 2023-12-12 NOTE — ASSESSMENT
[FreeTextEntry1] : MRI from one year ago shows stenosis;  moderate;  PT is working so we'll continue that;  IF another 4-5 weeks of PT is not helping enough then she should transition to  HEP;  I proposed Anthony but she remains disinclined toward any injections;   Episodes of uncontrolled nausea or vomiting not relieved by anti-nausea medication

## 2024-11-07 ENCOUNTER — APPOINTMENT (OUTPATIENT)
Dept: ORTHOPEDIC SURGERY | Facility: CLINIC | Age: 74
End: 2024-11-07
Payer: MEDICARE

## 2024-11-07 VITALS — BODY MASS INDEX: 24.64 KG/M2 | HEIGHT: 71 IN | WEIGHT: 176 LBS

## 2024-11-07 DIAGNOSIS — M48.062 SPINAL STENOSIS, LUMBAR REGION WITH NEUROGENIC CLAUDICATION: ICD-10-CM

## 2024-11-07 DIAGNOSIS — M54.16 RADICULOPATHY, LUMBAR REGION: ICD-10-CM

## 2024-11-07 PROCEDURE — 72170 X-RAY EXAM OF PELVIS: CPT

## 2024-11-07 PROCEDURE — 99213 OFFICE O/P EST LOW 20 MIN: CPT

## 2024-11-07 PROCEDURE — 72110 X-RAY EXAM L-2 SPINE 4/>VWS: CPT

## 2024-11-18 ENCOUNTER — APPOINTMENT (OUTPATIENT)
Dept: VASCULAR SURGERY | Facility: CLINIC | Age: 74
End: 2024-11-18
Payer: MEDICARE

## 2024-11-18 VITALS
HEART RATE: 60 BPM | DIASTOLIC BLOOD PRESSURE: 79 MMHG | BODY MASS INDEX: 22.68 KG/M2 | TEMPERATURE: 98.2 F | WEIGHT: 162 LBS | SYSTOLIC BLOOD PRESSURE: 131 MMHG | HEIGHT: 71 IN | OXYGEN SATURATION: 98 %

## 2024-11-18 DIAGNOSIS — I83.893 VARICOSE VEINS OF BILATERAL LOWER EXTREMITIES WITH OTHER COMPLICATIONS: ICD-10-CM

## 2024-11-18 PROCEDURE — 99213 OFFICE O/P EST LOW 20 MIN: CPT

## 2025-03-18 ENCOUNTER — APPOINTMENT (OUTPATIENT)
Dept: ORTHOPEDIC SURGERY | Facility: CLINIC | Age: 75
End: 2025-03-18
Payer: MEDICARE

## 2025-03-18 VITALS — HEIGHT: 71 IN | BODY MASS INDEX: 22.68 KG/M2 | WEIGHT: 162 LBS

## 2025-03-18 DIAGNOSIS — M54.16 RADICULOPATHY, LUMBAR REGION: ICD-10-CM

## 2025-03-18 DIAGNOSIS — M48.062 SPINAL STENOSIS, LUMBAR REGION WITH NEUROGENIC CLAUDICATION: ICD-10-CM

## 2025-03-18 PROCEDURE — 99213 OFFICE O/P EST LOW 20 MIN: CPT
